# Patient Record
Sex: FEMALE | Race: WHITE | NOT HISPANIC OR LATINO
[De-identification: names, ages, dates, MRNs, and addresses within clinical notes are randomized per-mention and may not be internally consistent; named-entity substitution may affect disease eponyms.]

---

## 2020-10-12 ENCOUNTER — TRANSCRIPTION ENCOUNTER (OUTPATIENT)
Age: 45
End: 2020-10-12

## 2020-10-17 ENCOUNTER — TRANSCRIPTION ENCOUNTER (OUTPATIENT)
Age: 45
End: 2020-10-17

## 2020-12-25 ENCOUNTER — TRANSCRIPTION ENCOUNTER (OUTPATIENT)
Age: 45
End: 2020-12-25

## 2021-10-05 ENCOUNTER — TRANSCRIPTION ENCOUNTER (OUTPATIENT)
Age: 46
End: 2021-10-05

## 2022-01-03 ENCOUNTER — TRANSCRIPTION ENCOUNTER (OUTPATIENT)
Age: 47
End: 2022-01-03

## 2022-05-31 PROBLEM — Z00.00 ENCOUNTER FOR PREVENTIVE HEALTH EXAMINATION: Status: ACTIVE | Noted: 2022-05-31

## 2022-11-10 ENCOUNTER — APPOINTMENT (OUTPATIENT)
Dept: ORTHOPEDIC SURGERY | Facility: CLINIC | Age: 47
End: 2022-11-10

## 2022-11-10 DIAGNOSIS — S43.421A SPRAIN OF RIGHT ROTATOR CUFF CAPSULE, INITIAL ENCOUNTER: ICD-10-CM

## 2022-11-10 DIAGNOSIS — Z78.9 OTHER SPECIFIED HEALTH STATUS: ICD-10-CM

## 2022-11-10 PROCEDURE — 99213 OFFICE O/P EST LOW 20 MIN: CPT

## 2022-11-10 PROCEDURE — 73030 X-RAY EXAM OF SHOULDER: CPT | Mod: RT

## 2022-11-10 NOTE — ASSESSMENT
[FreeTextEntry1] : Underlying pathology reviewed and treatment options discussed. \par 11/10/2022 : xrays right shoulder, 2 views, negative\par Obtain MRI  R shoulder r/o Subscap tear. \par Activity modifier as tolerated.\par OTC NSAIDs if needed\par Questions addressed.\par \par

## 2022-11-10 NOTE — PHYSICAL EXAM
[Right] : right shoulder [Standing] : standing [Mild] : mild [5 ___] : forward flexion 5[unfilled]/5 [4___] : internal rotation 4[unfilled]/5 [] : no sensory deficits [de-identified] : weakness subscap testing. Belly test is positive.  [TWNoteComboBox7] : active forward flexion 175 degrees [TWNoteComboBox6] : internal rotation L4

## 2022-11-10 NOTE — HISTORY OF PRESENT ILLNESS
[Sudden] : sudden [Result of repetitive motion] : result of repetitive motion [8] : 8 [6] : 6 [Dull/Aching] : dull/aching [Localized] : localized [Constant] : constant [Leisure] : leisure [Rest] : rest [Exercising] : exercising [de-identified] : Pt is a 46 year old RHD F who presents today for evaluation of their right shoulder. Pt states that she was doing shoulder exercises 7/2022 and felt a pop in her shoulder that’s gotten progressively worse. Seen in the past by Dr Sharma for L shoulder calcific deposit and CSI (last visit 2019). Pain localized along the lateral cspine to her shoulder. Difficulty abducting. No numbness/tingling but has discomfort overall and lacking  strength. Ice to affected area, IBU PRN, theragun, IcyHot. No formal treatment to date. Resting from exercise and transitioned to yoga etc  [] : Post Surgical Visit: no [FreeTextEntry1] : R shoulder

## 2022-11-17 ENCOUNTER — FORM ENCOUNTER (OUTPATIENT)
Age: 47
End: 2022-11-17

## 2022-11-18 ENCOUNTER — APPOINTMENT (OUTPATIENT)
Dept: MRI IMAGING | Facility: CLINIC | Age: 47
End: 2022-11-18

## 2022-11-18 PROCEDURE — 73221 MRI JOINT UPR EXTREM W/O DYE: CPT | Mod: RT

## 2022-11-19 ENCOUNTER — TRANSCRIPTION ENCOUNTER (OUTPATIENT)
Age: 47
End: 2022-11-19

## 2022-12-01 ENCOUNTER — APPOINTMENT (OUTPATIENT)
Dept: ORTHOPEDIC SURGERY | Facility: CLINIC | Age: 47
End: 2022-12-01

## 2022-12-01 VITALS — WEIGHT: 135 LBS | BODY MASS INDEX: 23.05 KG/M2 | HEIGHT: 64 IN

## 2022-12-01 DIAGNOSIS — M75.41 IMPINGEMENT SYNDROME OF RIGHT SHOULDER: ICD-10-CM

## 2022-12-01 PROCEDURE — 99214 OFFICE O/P EST MOD 30 MIN: CPT

## 2022-12-01 RX ORDER — MELOXICAM 15 MG/1
15 TABLET ORAL
Qty: 30 | Refills: 0 | Status: COMPLETED | COMMUNITY
Start: 2022-12-01 | End: 2022-12-31

## 2022-12-01 NOTE — HISTORY OF PRESENT ILLNESS
[Sudden] : sudden [Result of repetitive motion] : result of repetitive motion [8] : 8 [6] : 6 [Dull/Aching] : dull/aching [Localized] : localized [Constant] : constant [Leisure] : leisure [Rest] : rest [Exercising] : exercising [de-identified] : Pt is a 46 year old RHD F who presents today for evaluation of their right shoulder. Pt states that she was doing shoulder exercises 7/2022 and felt a pop in her shoulder that’s gotten progressively worse. Seen in the past by Dr Sharma for L shoulder calcific deposit and CSI (last visit 2019). Pain localized along the lateral cspine to her shoulder. Difficulty abducting. No numbness/tingling but has discomfort overall and lacking  strength. Ice to affected area, IBU PRN, theragun, IcyHot. No formal treatment to date. Resting from exercise and transitioned to yoga etc  [] : Post Surgical Visit: no [FreeTextEntry1] : R shoulder [de-identified] : MRI

## 2022-12-01 NOTE — ASSESSMENT
[FreeTextEntry1] : Underlying pathology reviewed and treatment options discussed. \par 11/10/2022 : xrays right shoulder, 2 views, negative\par Obtain MRI  R shoulder r/o Subscap tear. \par Activity modifier as tolerated.\par OTC NSAIDs if needed\par Questions addressed.\par \par 12/01/2022: MRI reviewed and discussed.\par surgical intervention not indicated. \par Start PT and HEP to improve mechanics and reduce pain.\par prescribed mobic to reduce inflammation\par Questions addressed.\par \par The documentation recorded by the scribe accurately reflects the service I personally performed and the decisions made by me.\par I, Mark Anthony Mackibgermain, attest that this documentation has been prepared under the direction and in the presence of Provider Binh Sharma MD.\par \par The patient was seen by Binh Sharma MD.\par \par

## 2022-12-01 NOTE — DATA REVIEWED
[MRI] : MRI [Right] : of the right [Shoulder] : shoulder [Report was reviewed and noted in the chart] : The report was reviewed and noted in the chart [I reviewed the films/CD and agree] : I reviewed the films/CD and agree [FreeTextEntry1] : Impression: r shoulder\par 1. Slight partial tearing at the junctional insertion of the supraspinatus and infraspinatus tendons with mild \par associated tendinopathy and surrounding bursitis.\par 2. Tearing involving the superior and anterior labrum with mild surrounding effusion and synovitis.\par 3. Mild biceps tenosynovitis.\par 4. Mild AC joint arthrosis.\par 5. No subscapularis tendon tear, acute fracture or disproportionate muscle atrophy.\par Date of Dictation 11/20/2022/Electronically signed by Simon Huggins MD 11/21/2022 8:34:25 AM

## 2022-12-01 NOTE — PHYSICAL EXAM
[Right] : right shoulder [Standing] : standing [Mild] : mild [5 ___] : forward flexion 5[unfilled]/5 [4___] : internal rotation 4[unfilled]/5 [] : no sensory deficits [de-identified] : weakness subscap testing. Belly test is positive.  [TWNoteComboBox7] : active forward flexion 175 degrees [TWNoteComboBox6] : internal rotation L4

## 2022-12-19 ENCOUNTER — APPOINTMENT (OUTPATIENT)
Dept: ORTHOPEDIC SURGERY | Facility: CLINIC | Age: 47
End: 2022-12-19

## 2024-11-16 ENCOUNTER — APPOINTMENT (OUTPATIENT)
Dept: ORTHOPEDIC SURGERY | Facility: CLINIC | Age: 49
End: 2024-11-16

## 2024-11-19 ENCOUNTER — APPOINTMENT (OUTPATIENT)
Dept: ORTHOPEDIC SURGERY | Facility: CLINIC | Age: 49
End: 2024-11-19